# Patient Record
Sex: FEMALE | Race: BLACK OR AFRICAN AMERICAN | Employment: STUDENT | URBAN - METROPOLITAN AREA
[De-identification: names, ages, dates, MRNs, and addresses within clinical notes are randomized per-mention and may not be internally consistent; named-entity substitution may affect disease eponyms.]

---

## 2019-10-17 ENCOUNTER — HOSPITAL ENCOUNTER (EMERGENCY)
Age: 29
Discharge: HOME | End: 2019-10-17
Attending: EMERGENCY MEDICINE
Payer: MEDICAID

## 2019-10-17 VITALS
SYSTOLIC BLOOD PRESSURE: 133 MMHG | HEIGHT: 63 IN | DIASTOLIC BLOOD PRESSURE: 91 MMHG | WEIGHT: 148.81 LBS | HEART RATE: 87 BPM | BODY MASS INDEX: 26.37 KG/M2 | RESPIRATION RATE: 16 BRPM | OXYGEN SATURATION: 100 % | TEMPERATURE: 98.3 F
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
## 2019-10-17 DIAGNOSIS — R23.8 SKIN IRRITATION: Primary | ICD-10-CM

## 2019-10-17 PROCEDURE — 99282 EMERGENCY DEPT VISIT SF MDM: CPT

## 2019-10-17 NOTE — DISCHARGE INSTRUCTIONS
Patient Education        Learning About Your Skin  What does your skin do? Your skin protects you from the environment around you. It helps keep infection and toxins out of your body. It also helps your body stay at a constant temperature. Nerve endings in your skin make it possible to feel sensations, such as hot and cold or pain. The skin has three layers:  · The epidermis, which is the layer you see. · The dermis, which is below the epidermis. · The subcutaneous tissue, which is below the dermis. What problems can happen to your skin? Skin problems include:  · Viral, fungal, and bacterial infections, such as cold sores, warts, shingles, boils, ringworm, and athlete's foot. · Skin growths, such as moles, skin tags, and seborrheic keratoses. Skin tags are unsightly but harmless growths that show up in skin folds. Seborrheic keratoses are slightly raised tan spots that may look like warts but are almost always harmless. · Conditions such as psoriasis, eczema, and rosacea. ? Psoriasis (say \"dlk-IO-zq-joy\") is a long-term skin problem that causes thick, white, silvery, or red patches of skin. ? Eczema is a group of long-term skin problems that may cause itching, small bumps that look like blisters, and thickened and scaly skin. ? Rosacea (say \"lgo-CYE-xepu\") is a skin disease that causes redness and pimples on your nose, cheeks, chin, and forehead. · Symptoms from food allergies, drug allergies, and other allergies. Symptoms include rashes and hives. · Skin cancer, such as melanoma, basal cell cancer, and squamous cell cancer. How can you prevent skin problems? To help with dry skin:  · Shower or bathe in lukewarm water. Don't shower too often--just when you're dirty or sweaty. · Avoid washing with soap during every bath. When soap is needed, use a gentle, nondrying product, such as Aveeno, Dove, or Neutrogena. Use soap only on the underarms, groin, and feet, and rinse right away.   · Pat your skin dry after a bath or shower. · Apply a moisturizer right away, such as Aquaphor, Eucerin, or Purpose. Apply moisturizer several times a day. Use moisturizer on your hands, especially if you must wear gloves often or if the air is dry where you live. · Consider using a humidifier if the air inside your home is very dry. · Protect your lips with lipstick or a lip balm. To make skin infection less likely:  · Avoid squeezing any lumps that form under the skin. · Wash soon after doing things that cause you to sweat. · Avoid skin care products that contain oil, which may clog your pores. Instead, use water-based skin care products. Read the labels on products and look for the terms oil-free, hypoallergenic, and non-comedogenic. To protect your skin from the sun:  · Try to stay out of the sun, especially from 10 a.m. to 4 p.m. · Wear protective clothing, including a wide-brimmed hat, a long-sleeved shirt, and pants. Wear sunglasses that block ultraviolet (UV) rays. · Make sure to use a broad-spectrum sunscreen that has a sun protection factor (SPF) of 30 or higher. Use it every day, all year, even when it is cloudy. · Don't forgot to use sunscreen on the back of your hands. · Do not use tanning booths and sunlamps, which give off ultraviolet radiation and can cause skin damage and may increase the risk of skin cancer. For general skin health:  · Eat healthy foods. Your skin benefits when you eat plenty of fruits, vegetables, and grains. It's also important to eat foods that contain protein. · Do not smoke or allow others to smoke around you. The chemicals in cigarettes can make your skin age faster. If you need help quitting, talk to your doctor about stop-smoking programs and medicines. These can increase your chances of quitting for good. · Manage stress. Stress can make some skin conditions worse. Where can you learn more? Go to http://rosalie.info/.   Enter M224 in the search box to learn more about \"Learning About Your Skin. \"  Current as of: April 1, 2019  Content Version: 12.2  © 3361-9223 [employer]. Care instructions adapted under license by [employer] (which disclaims liability or warranty for this information). If you have questions about a medical condition or this instruction, always ask your healthcare professional. [address] any warranty or liability for your use of this information.

## 2019-10-17 NOTE — ED PROVIDER NOTES
EMERGENCY DEPARTMENT HISTORY AND PHYSICAL EXAM      Date: 10/17/2019  Patient Name: Zonia Mckeon    History of Presenting Illness     Chief Complaint   Patient presents with   [address] [facility] [address] Burn     Patient stated that she was at work and filtering the fryer. Oil came out of the bottom and splashed on her leg. Patient stated that she had pants on but it burned her right lateral lower leg through the pants. No blisters noted, tender on palpation and oil noted on the lower leg. History Provided By: Patient    HPI: Zonia Mckeon, 33 y.o. female presents by POV to the ED with cc of irritation to the skin on the right lower leg. The patient works at a [employer] and notes that grease was accidentally splashed onto her pants leg. This did not burn her skin but soaked through her pants leg and got onto her skin. She notes irritation to the skin and states that her boss asked that she come to the ED to have the area cleansed and evaluated. There are no other complaints, changes, or physical findings at this time. Social Hx: Tobacco (denies), EtOH (denies), Illicit drug use (denies)     PCP: No primary care provider on file. No current facility-administered medications on file prior to encounter. No current outpatient medications on file prior to encounter. Past History     Past Medical History:  History reviewed. No pertinent past medical history. Past Surgical History:  Past Surgical History:   Procedure Laterality Date    HX  SECTION         Family History:  History reviewed. No pertinent family history. Social History:  Social History     Tobacco Use    Smoking status: Never Smoker    Smokeless tobacco: Never Used   Substance Use Topics    Alcohol use: Not Currently    Drug use: Never       Allergies:  No Known Allergies      Review of Systems   Review of Systems   Constitutional: Negative for chills, diaphoresis and fever.    HENT: Negative for congestion, ear pain, rhinorrhea and sore throat. Respiratory: Negative for cough and shortness of breath. Cardiovascular: Negative for chest pain. Gastrointestinal: Negative for abdominal pain, constipation, diarrhea, nausea and vomiting. Genitourinary: Negative for difficulty urinating, dysuria, frequency and hematuria. Musculoskeletal: Negative for arthralgias and myalgias. Skin:        + skin irritation   Neurological: Negative for headaches. All other systems reviewed and are negative. Physical Exam   Physical Exam   Constitutional: She is oriented to person, place, and time. She appears well-developed and well-nourished. No distress. 33 y.o. -American female    HENT:   Head: Normocephalic and atraumatic. Eyes: Conjunctivae are normal. Right eye exhibits no discharge. Left eye exhibits no discharge. Neck: Normal range of motion. Neck supple. Cardiovascular: Normal rate. Pulmonary/Chest: Effort normal.   Neurological: She is alert and oriented to person, place, and time. Skin: Skin is warm and dry. She is not diaphoretic. Oily like substance on the lateral aspect of the right lower leg without blistering or erythema to the lower leg, which is non-tender to palpation. Psychiatric: She has a normal mood and affect. Her behavior is normal.   Nursing note and vitals reviewed. Diagnostic Study Results     Labs - none    Radiologic Studies - none    Medical Decision Making   I am the first provider for this patient. I reviewed the vital signs, available nursing notes, past medical history, past surgical history, family history and social history. Vital Signs-Reviewed the patient's vital signs. Patient Vitals for the past 12 hrs:   Temp Pulse Resp BP SpO2   10/17/19 1328 98.3 °F (36.8 °C) 87 16 (!) 133/91 100 %       Records Reviewed: Nursing Notes    Provider Notes (Medical Decision Making):   Skin irritation, burn, dermatitis, work related injury    ED Course:   Initial assessment performed.  The patients presenting problems have been discussed, and they are in agreement with the care plan formulated and outlined with them. I have encouraged them to ask questions as they arise throughout their visit. Critical Care Time: None    Disposition:  DISCHARGE NOTE:  2:19 PM  The pt is ready for discharge. The pt's signs, symptoms, diagnosis, and discharge instructions have been discussed and pt has conveyed their understanding. The pt is to follow up as recommended or return to ER should their symptoms worsen. Plan has been discussed and pt is in agreement. PLAN:  1. There are no discharge medications for this patient. 2.   Follow-up Information     Follow up With Specialties Details Why Contact Info    Your PCP   As needed         Return to ED if worse     Diagnosis     Clinical Impression:   1. Skin irritation    2. Work related injury          Please note that this dictation was completed with [email or web address], the computer voice recognition software. Quite often unanticipated grammatical, syntax, homophones, and other interpretive errors are inadvertently transcribed by the computer software. Please disregards these errors. Please excuse any errors that have escaped final proofreading. This note will not be viewable in [address]